# Patient Record
Sex: FEMALE | Race: ASIAN | NOT HISPANIC OR LATINO | Employment: FULL TIME | URBAN - METROPOLITAN AREA
[De-identification: names, ages, dates, MRNs, and addresses within clinical notes are randomized per-mention and may not be internally consistent; named-entity substitution may affect disease eponyms.]

---

## 2018-12-20 ENCOUNTER — TELEPHONE (OUTPATIENT)
Dept: SCHEDULING | Facility: IMAGING CENTER | Age: 28
End: 2018-12-20

## 2019-01-23 ENCOUNTER — OFFICE VISIT (OUTPATIENT)
Dept: MEDICAL GROUP | Facility: PHYSICIAN GROUP | Age: 29
End: 2019-01-23
Payer: COMMERCIAL

## 2019-01-23 ENCOUNTER — HOSPITAL ENCOUNTER (OUTPATIENT)
Facility: MEDICAL CENTER | Age: 29
End: 2019-01-23
Attending: PHYSICIAN ASSISTANT
Payer: COMMERCIAL

## 2019-01-23 VITALS
HEART RATE: 87 BPM | OXYGEN SATURATION: 96 % | TEMPERATURE: 99.4 F | BODY MASS INDEX: 22.84 KG/M2 | SYSTOLIC BLOOD PRESSURE: 110 MMHG | RESPIRATION RATE: 14 BRPM | HEIGHT: 61 IN | WEIGHT: 121 LBS | DIASTOLIC BLOOD PRESSURE: 78 MMHG

## 2019-01-23 DIAGNOSIS — Z11.3 ROUTINE SCREENING FOR STI (SEXUALLY TRANSMITTED INFECTION): ICD-10-CM

## 2019-01-23 DIAGNOSIS — Z72.51 HIGH RISK HETEROSEXUAL BEHAVIOR: ICD-10-CM

## 2019-01-23 DIAGNOSIS — Z30.011 ENCOUNTER FOR INITIAL PRESCRIPTION OF CONTRACEPTIVE PILLS: ICD-10-CM

## 2019-01-23 LAB
INT CON NEG: NEGATIVE
INT CON POS: POSITIVE
POC URINE PREGNANCY TEST: NEGATIVE

## 2019-01-23 PROCEDURE — 99204 OFFICE O/P NEW MOD 45 MIN: CPT | Performed by: PHYSICIAN ASSISTANT

## 2019-01-23 PROCEDURE — 87591 N.GONORRHOEAE DNA AMP PROB: CPT

## 2019-01-23 PROCEDURE — 81025 URINE PREGNANCY TEST: CPT | Performed by: PHYSICIAN ASSISTANT

## 2019-01-23 PROCEDURE — 87491 CHLMYD TRACH DNA AMP PROBE: CPT

## 2019-01-23 RX ORDER — DROSPIRENONE AND ETHINYL ESTRADIOL 0.02-3(28)
1 KIT ORAL DAILY
Qty: 28 TAB | Refills: 11 | Status: SHIPPED | OUTPATIENT
Start: 2019-01-23 | End: 2020-02-06

## 2019-01-23 ASSESSMENT — PATIENT HEALTH QUESTIONNAIRE - PHQ9: CLINICAL INTERPRETATION OF PHQ2 SCORE: 0

## 2019-01-23 NOTE — LETTER
[facility]  Oanh Martin P.A.-C.  [address]  [place] [address]  Fax: [phone]   Authorization for Release/Disclosure of   Protected Health Information   Name: CHARLI FRANKLIN : 1990 SSN: [id number]   Address: [address] [place] [address] [place] [address] Phone:    [phone] (home)    I authorize the entity listed below to release/disclose the PHI below to:   [facility]/Oanh Martin P.A.-C. and Oanh Martin P.A.-C.   Provider or Entity Name:     Address   City, State, Zip   Phone:      Fax:     Reason for request: continuity of care   Information to be released:    [  ] LAST COLONOSCOPY,  including any PATH REPORT and follow-up  [  ] LAST FIT/COLOGUARD RESULT [  ] LAST DEXA  [  ] LAST MAMMOGRAM  [  ] LAST PAP  [  ] LAST LABS [  ] RETINA EXAM REPORT  [  ] IMMUNIZATION RECORDS  [  ] Release all info      [  ] Check here and initial the line next to each item to release ALL health information INCLUDING  _____ Care and treatment for drug and / or alcohol abuse  _____ HIV testing, infection status, or AIDS  _____ Genetic Testing    DATES OF SERVICE OR TIME PERIOD TO BE DISCLOSED: _____________  I understand and acknowledge that:  * This Authorization may be revoked at any time by you in writing, except if your health information has already been used or disclosed.  * Your health information that will be used or disclosed as a result of you signing this authorization could be re-disclosed by the recipient. If this occurs, your re-disclosed health information may no longer be protected by State or Federal laws.  * You may refuse to sign this Authorization. Your refusal will not affect your ability to obtain treatment.  * This Authorization becomes effective upon signing and will  on (date) __________.      If no date is indicated, this Authorization will  one (1) year from the signature date.    Name: Charli Franklin    Signature:   Date:     2019       PLEASE FAX REQUESTED RECORDS  BACK TO: [phone]

## 2019-01-24 DIAGNOSIS — Z11.3 ROUTINE SCREENING FOR STI (SEXUALLY TRANSMITTED INFECTION): ICD-10-CM

## 2019-01-24 DIAGNOSIS — Z72.51 HIGH RISK HETEROSEXUAL BEHAVIOR: ICD-10-CM

## 2019-01-24 LAB
C TRACH DNA SPEC QL NAA+PROBE: NEGATIVE
N GONORRHOEA DNA SPEC QL NAA+PROBE: NEGATIVE
SPECIMEN SOURCE: NORMAL

## 2019-01-24 NOTE — PROGRESS NOTES
Chief Complaint   Patient presents with   • Establish Care     pt would like BC       HISTORY OF THE PRESENT ILLNESS: Jennifer Emerson is a 28 y.o. female new patient to our practice. This pleasant patient is here today to establish care and to discuss the evaluation and management of:    Patient is a pleasant 28-year-old female here today to establish care.  She tells me she is in overall good health and would like to discuss oral contraceptive.  She tells me that she is sexually active with multiple partners.  States she is not in a committed relationship and is dating.  Admits to male condom use but not always.  Patient would like to be screened for chlamydia and gonorrhea.  She tells me that she was on Ortho Tri-Cyclen for 4-5 years but during the last year of taking the medication she experienced unpleasant side effects.  States she felt nauseous and had an upset stomach every day after taking her medication.  States she discontinue medication sometime from 03/18 -06/18.  She tells me menstrual cycles are regular and menses without birth control is 5 days in duration.  States when on birth control and sees was 3-4 days in duration.  States without birth control menstrual periods are positive for heavy to moderate bleeding and with birth control menses was light.  She tells me she has never experienced menstrual cramping until the last month.  States last menstrual period was the first of this month.  She denies being pregnant.  Denies personal history or family history of DVT/PEs.  Denies smoking.  Denies having a sedentary lifestyle.  States exercise routine consist of lifting weights 4 days a week 1-1.5 hours per time.  States her diet is healthy.    She denies blisters/warts/lesions.  Denies fever, chills, night sweats, nausea, vomiting, unintentional weight loss, joint pain, pelvic pain, dysuria, hematuria, urethral discharge, foul-smelling discharge.      History reviewed. No pertinent past medical  history.    History reviewed. No pertinent surgical history.    Family Status   Relation Status   • Mo Alive   • Fa    • Sis Alive   • Bro Alive   • MGMo Alive   • MGFa    • PGMo Alive   • PGFa Alive     Family History   Problem Relation Age of Onset   • Cancer Father    • No Known Problems Sister    • No Known Problems Brother    • Heart Disease Paternal Grandmother    • Diabetes Paternal Grandfather    • Heart Attack Paternal Grandfather        Social History   Substance Use Topics   • Smoking status: Never Smoker   • Smokeless tobacco: Never Used   • Alcohol use Yes      Comment: socially.        Allergies: Patient has no known allergies.    Current Outpatient Prescriptions Ordered in [facility]   Medication Sig Dispense Refill   • drospirenone-ethinyl estradiol (DEBBIE) 3-0.02 MG per tablet Take 1 Tab by mouth every day. 28 Tab 11     No current [facility]-ordered facility-administered medications on file.        Review of Systems   Constitutional: Negative for fever, chills, weight loss and malaise/fatigue.   HENT: Negative for ear pain, nosebleeds, congestion, sore throat and neck pain.    Eyes: Negative for blurred vision.   Respiratory: Negative for cough, sputum production, shortness of breath and wheezing.    Cardiovascular: Negative for chest pain, palpitations, orthopnea and leg swelling.   Gastrointestinal: Negative for heartburn, nausea, vomiting and abdominal pain.   Genitourinary: Negative for dysuria, urgency and frequency.   Musculoskeletal: Negative for myalgias, back pain and joint pain.   Skin: Negative for rash and itching.   Neurological: Negative for dizziness, tingling, tremors, sensory change, focal weakness and headaches.   Endo/Heme/Allergies: Does not bruise/bleed easily.   Psychiatric/Behavioral: Negative for depression, anxiety, or memory loss.     All other systems reviewed and are negative except as in HPI.    Exam: Blood pressure 110/78, pulse 87, temperature 37.4 °C (99.4 °F),  "resp. rate 14, height 1.549 m (5' 1\"), weight 54.9 kg (121 lb), SpO2 96 %. Body mass index is 22.86 kg/m².  General: Normal appearing. No distress.  HEENT: Normocephalic. Eyes conjunctiva clear lids without ptosis, pupils equal and reactive to light accommodation, ears normal shape and contour, canals are clear bilaterally, tympanic membranes are benign, nasal mucosa benign, oropharynx is without erythema, edema or exudates.   Neck: Supple without JVD or bruit. Thyroid is not enlarged.  Pulmonary: Clear to ausculation.  Normal effort. No rales, ronchi, or wheezing.  Cardiovascular: Regular rate and rhythm without murmur.   Abdomen: Soft, nontender, nondistended. Normal bowel sounds. Liver and spleen are not palpable  Neurologic: Grossly nonfocal  Lymph: No cervical, supraclavicular or axillary lymph nodes are palpable  Skin: Warm and dry.  No obvious lesions.  Musculoskeletal: Normal gait. No extremity cyanosis, clubbing, or edema.  Psych: Normal mood and affect. Alert and oriented x3. Judgment and insight is normal.      Medical decision-making and discussion:  1. Encounter for initial prescription of contraceptive pills  POCT Pregnancy: Negative  Discussed OCPs at length. Instructed on importance of taking pill at the same time every day. Use back-up for up to 2 weeks. Discussed risks associated with OCPs including: blood clot, heart attack, and stroke.  Patient denies smoking.  Advised not to smoke while on hormonal birth control.  Emphasized the benefits of a healthy diet regular exercise routine with patient.    Follow-up for worsening symptoms,lack of expected recovery, or should new symptoms or problems arise.    - POCT Pregnancy  - drospirenone-ethinyl estradiol (DEBBIE) 3-0.02 MG per tablet; Take 1 Tab by mouth every day.  Dispense: 28 Tab; Refill: 11    2. Routine screening for STI (sexually transmitted infection)  3. High risk heterosexual behavior  Committee and gonorrhea have been ordered to further " evaluate patient.  Patient will be contacted with results.  Patient declined HIV and syphilis workup.  Discussed STI risk with patient.  Discussed importance of practicing safe sex.    Follow-up for worsening symptoms,lack of expected recovery, or should new symptoms or problems arise.      - CHLAMYDIA/GC PCR URINE OR SWAB; Future    She tells me that her Pap smear is up-to-date.  States Pap smear was completed 2 years ago and [place].  Medical release form has been signed by patient to obtain past medical records.  States her influenza vaccination is up-to-date as well.    Please note that this dictation was created using voice recognition software. I have made every reasonable attempt to correct obvious errors, but I expect that there are errors of grammar and possibly content that I did not discover before finalizing the note.        Return if symptoms worsen or fail to improve.

## 2019-01-25 ENCOUNTER — TELEPHONE (OUTPATIENT)
Dept: MEDICAL GROUP | Facility: PHYSICIAN GROUP | Age: 29
End: 2019-01-25

## 2019-01-25 NOTE — TELEPHONE ENCOUNTER
Phone Number Called: [phone] (home)       Message: Left generic voice message for the pt to call back re results.    Left Message for patient to call back: yes

## 2019-01-25 NOTE — TELEPHONE ENCOUNTER
----- Message from Oanh Martin P.A.-C. sent at 1/25/2019  7:09 AM PST -----  Please call patient. I have reviewed patient's lab work and patient is negative for chlamydia and gonorrhea.    Thank you,    Meghan BOONE

## 2019-01-31 NOTE — TELEPHONE ENCOUNTER
Phone Number Called: [phone] (home)     Message: Spoke with patient notified of results below no questions at this time.     Left Message for patient to call back: N\A

## 2019-08-23 ENCOUNTER — HOSPITAL ENCOUNTER (OUTPATIENT)
Facility: MEDICAL CENTER | Age: 29
End: 2019-08-23
Attending: PHYSICIAN ASSISTANT
Payer: COMMERCIAL

## 2019-08-23 ENCOUNTER — OFFICE VISIT (OUTPATIENT)
Dept: MEDICAL GROUP | Facility: PHYSICIAN GROUP | Age: 29
End: 2019-08-23
Payer: COMMERCIAL

## 2019-08-23 VITALS
HEART RATE: 60 BPM | WEIGHT: 120 LBS | OXYGEN SATURATION: 100 % | DIASTOLIC BLOOD PRESSURE: 70 MMHG | SYSTOLIC BLOOD PRESSURE: 110 MMHG | RESPIRATION RATE: 12 BRPM | HEIGHT: 61 IN | TEMPERATURE: 97.9 F | BODY MASS INDEX: 22.66 KG/M2

## 2019-08-23 DIAGNOSIS — Z72.51 HIGH RISK HETEROSEXUAL BEHAVIOR: ICD-10-CM

## 2019-08-23 DIAGNOSIS — Z12.4 SCREENING FOR CERVICAL CANCER: ICD-10-CM

## 2019-08-23 DIAGNOSIS — Z01.419 WOMEN'S ANNUAL ROUTINE GYNECOLOGICAL EXAMINATION: Primary | ICD-10-CM

## 2019-08-23 DIAGNOSIS — Z01.419 WOMEN'S ANNUAL ROUTINE GYNECOLOGICAL EXAMINATION: ICD-10-CM

## 2019-08-23 PROCEDURE — 99395 PREV VISIT EST AGE 18-39: CPT | Performed by: PHYSICIAN ASSISTANT

## 2019-08-23 PROCEDURE — 87491 CHLMYD TRACH DNA AMP PROBE: CPT

## 2019-08-23 PROCEDURE — 87480 CANDIDA DNA DIR PROBE: CPT

## 2019-08-23 PROCEDURE — 87591 N.GONORRHOEAE DNA AMP PROB: CPT

## 2019-08-23 PROCEDURE — 87660 TRICHOMONAS VAGIN DIR PROBE: CPT

## 2019-08-23 PROCEDURE — 87510 GARDNER VAG DNA DIR PROBE: CPT

## 2019-08-23 PROCEDURE — 88175 CYTOPATH C/V AUTO FLUID REDO: CPT

## 2019-08-23 NOTE — PROGRESS NOTES
SUBJECTIVE: 28 y.o. female for annual routine gynecologic exam  Chief Complaint   Patient presents with   • Gynecologic Exam     Pap Smear        OB History   No data available      Last Pap: ~ 3 y ears ago  Social History     Substance and Sexual Activity   Sexual Activity Yes   • Partners: Male   • Birth control/protection: Condom Male     Sexual history: currently sexually active, multiple partners, heterosexual   H/O Abnormal Pap no  She  reports that she has never smoked. She has never used smokeless tobacco.        Allergies: Patient has no known allergies.     ROS:    Reports no menopause symptoms of hot flashes, night sweats, sleep disruption, mood changes.Denies vaginal dryness.   Menses every month with 5 days spotting, light, moderate, heavy (2 days) bleeding   Cramping is mild but can occasionally be severe.  She does not take OTC analgesics for cramping  No significant bloating/fluid retention, pelvic pain, or dyspareunia. No vaginal discharge   No breast tenderness, mass, nipple discharge, changes in size or contour, or abnormal cyclic discomfort.  No urinary tract symptoms, no incontinence, no polydipsia, polyuria,  No abdominal pain, change in bowel habits, black or bloody stools.    No unusual headaches, no visual changes, menstrual migraines   No prolonged cough. No dyspnea or chest pain on exertion.  No depression, labile mood, anxiety, libido changes, insomnia.  No temperature intolerance.  No new/concerning skin lesions, concerns.     Exercise: moderate regular exercise program she tells me that exercise routine consist of cardiovascular and strength training 5-6 times a week 1.5-2 hours per time.    Preventive Care:  Denies doing self breast exam. Does not take a multi-vitamin.     Current medicines (including changes today)  Current Outpatient Medications   Medication Sig Dispense Refill   • drospirenone-ethinyl estradiol (DEBBIE) 3-0.02 MG per tablet Take 1 Tab by mouth every day. (Patient not  "taking: Reported on 8/23/2019) 28 Tab 11     No current facility-administered medications for this visit.      She  has no past medical history on file.  She  has no past surgical history on file.     Family History:   Family History   Problem Relation Age of Onset   • Cancer Father    • No Known Problems Sister    • No Known Problems Brother    • Heart Disease Paternal Grandmother    • Diabetes Paternal Grandfather    • Heart Attack Paternal Grandfather        OBJECTIVE:   /70 (BP Location: Left arm, Patient Position: Sitting, BP Cuff Size: Adult)   Pulse 60   Temp 36.6 °C (97.9 °F) (Temporal)   Resp 12   Ht 1.549 m (5' 1\")   Wt 54.4 kg (120 lb)   LMP 07/29/2019   SpO2 100%   Breastfeeding? No   BMI 22.67 kg/m²   Body mass index is 22.67 kg/m².    Exam: /70 (BP Location: Left arm, Patient Position: Sitting, BP Cuff Size: Adult)   Pulse 60   Temp 36.6 °C (97.9 °F) (Temporal)   Resp 12   Ht 1.549 m (5' 1\")   Wt 54.4 kg (120 lb)   SpO2 100%   General: Normal appearing. No distress.  HEENT: Normocephalic. Eyes conjunctiva clear lids without ptosis, pupils equal and reactive to light accommodation, ears normal shape and contour, canals are clear bilaterally, tympanic membranes are benign, nasal mucosa benign, oropharynx is without erythema, edema or exudates.   Neck: Supple without JVD or bruit. Thyroid is not enlarged.  Pulmonary: Clear to ausculation.  Normal effort. No rales, ronchi, or wheezing.  Cardiovascular: Regular rate and rhythm without murmur.  Abdomen: Soft, nontender, nondistended. Normal bowel sounds. Liver and spleen are not palpable  Neurologic: Grossly nonfocal.  Cranial nerves are normal.  Lymph: No cervical, supraclavicular or axillary lymph nodes are palpable  Skin: Warm and dry.  No rashes or suspicious skin lesions.  Musculoskeletal: Normal gait. No extremity cyanosis, clubbing, or edema.  Psych: Normal mood and affect. Alert and oriented x3. Judgment and insight is " normal.     Breast Exam: Performed with instruction during examination. No axillary lymphadenopathy, no skin changes, no dominant masses. No nipple retraction  Pelvic Exam -  Normal external genitalia with no lesions. Vaginal Mucosa:  normal vaginal mucosa. + for yellow thin discharge. Cervix with no visible lesions but is friable. No cervical motion tenderness. Uterus is normal sized with no masses. No adnexal tenderness or enlargement appreciated. Thin Prep Pap is obtained, vaginal swab is obtained and specimen(s) sent to lab  Rectal: deferred    <ASSESSMENT and PLAN>     1. Women's annual routine gynecological examination  Thin Prep Pap is obtained, vaginal swab is obtained and specimen(s) sent to lab    - VAGINAL PATHOGENS DNA PANEL; Future  - THINPREP RFLX HPV ASCUS W/CTNG; Future    2. Screening for cervical cancer  Thin Prep Pap is obtained, vaginal swab is obtained and specimen(s) sent to lab    - VAGINAL PATHOGENS DNA PANEL; Future  - THINPREP RFLX HPV ASCUS W/CTNG; Future    3. High risk heterosexual behavior  Patient states she is currently sexually active with 3 separate male partners.  Denies condom use.  Patient is not on oral contraceptive.  States she discontinued oral contraceptive because she started experiencing headaches with it.  Lab work has been ordered to further evaluate patient.  Patient will be contacted with results.  Stressed the importance of practicing safe sex.  Discussed STI risk with patient.    Patient will follow up on 8/28/2019 to discuss eczema and headaches.    I will discuss birth control options with patient during that appointment.      - VAGINAL PATHOGENS DNA PANEL; Future  - THINPREP RFLX HPV ASCUS W/CTNG; Future  - RPR (SYPHILIS); Future  - HIV AG/AB COMBO ASSAY SCREENING; Future  - HEP C VIRUS ANTIBODY; Future    1. Women's annual routine gynecological examination  VAGINAL PATHOGENS DNA PANEL    THINPREP RFLX HPV ASCUS W/CTNG   2. Screening for cervical cancer  VAGINAL  PATHOGENS DNA PANEL    THINPREP RFLX HPV ASCUS W/CTNG   3. High risk heterosexual behavior  VAGINAL PATHOGENS DNA PANEL    THINPREP RFLX HPV ASCUS W/CTNG    RPR (SYPHILIS)    HIV AG/AB COMBO ASSAY SCREENING    HEP C VIRUS ANTIBODY       Discussed  breast self exam, STD prevention, HIV risk factors and prevention, feminine hygiene, adequate intake of calcium and vitamin D, diet and exercise   Follow-up in 3 years for next Gyn exam and 3 years for next Pap.   Next office visit for recheck of chronic medical conditions is due in 1 week.

## 2019-08-24 LAB
CANDIDA DNA VAG QL PROBE+SIG AMP: NEGATIVE
G VAGINALIS DNA VAG QL PROBE+SIG AMP: NEGATIVE
T VAGINALIS DNA VAG QL PROBE+SIG AMP: NEGATIVE

## 2019-08-26 LAB
C TRACH DNA GENITAL QL NAA+PROBE: NEGATIVE
CYTOLOGY REG CYTOL: NORMAL
N GONORRHOEA DNA GENITAL QL NAA+PROBE: NEGATIVE
SPECIMEN SOURCE: NORMAL

## 2019-08-28 ENCOUNTER — OFFICE VISIT (OUTPATIENT)
Dept: MEDICAL GROUP | Facility: PHYSICIAN GROUP | Age: 29
End: 2019-08-28
Payer: COMMERCIAL

## 2019-08-28 VITALS
HEIGHT: 61 IN | OXYGEN SATURATION: 99 % | TEMPERATURE: 98.4 F | BODY MASS INDEX: 22.66 KG/M2 | HEART RATE: 74 BPM | WEIGHT: 120 LBS | DIASTOLIC BLOOD PRESSURE: 68 MMHG | SYSTOLIC BLOOD PRESSURE: 112 MMHG | RESPIRATION RATE: 16 BRPM

## 2019-08-28 DIAGNOSIS — G44.89 OTHER HEADACHE SYNDROME: ICD-10-CM

## 2019-08-28 DIAGNOSIS — L30.9 ECZEMA, UNSPECIFIED TYPE: ICD-10-CM

## 2019-08-28 DIAGNOSIS — R51.9 NEW ONSET OF HEADACHES: ICD-10-CM

## 2019-08-28 PROCEDURE — 99214 OFFICE O/P EST MOD 30 MIN: CPT | Performed by: PHYSICIAN ASSISTANT

## 2019-08-28 RX ORDER — TRIAMCINOLONE ACETONIDE 5 MG/G
CREAM TOPICAL
Qty: 15 G | Refills: 2 | Status: SHIPPED | OUTPATIENT
Start: 2019-08-28 | End: 2020-02-06

## 2019-08-30 PROBLEM — G44.89 OTHER HEADACHE SYNDROME: Status: ACTIVE | Noted: 2019-08-30

## 2019-08-30 PROBLEM — R51.9 NEW ONSET OF HEADACHES: Status: ACTIVE | Noted: 2019-08-30

## 2019-08-30 PROBLEM — L30.9 ECZEMA: Status: ACTIVE | Noted: 2019-08-30

## 2019-08-30 NOTE — PROGRESS NOTES
Chief Complaint   Patient presents with   • Headache   • Medication Management     triamcinolon 0.5% cream       HISTORY OF PRESENT ILLNESS: Jennifer Emerson is an established 28 y.o. female here to discuss the evaluation and management of:    No problem-specific Assessment & Plan notes found for this encounter.      She is a pleasant 28-year-old female here today to discuss headache symptoms and eczema.  She tells me since around 11 years old she is been experiencing intermittent episodes of a low-grade throbbing headache pain located at the base of her head.  States 3+ months ago headache symptoms changed and now she intermittently experiences a severe sharp intense headache pain back of her head.  She tells me feels like her skull is ripping into.  States severe headache symptoms occur anywhere from 10 to 30 minutes in duration.  He tells me when severe headache symptoms develop she does experience nausea.  Denies vomiting, light sensitivity, sound sensitivity, vision changes.  Denies other neurological deficits.  States headache symptoms predominantly developed if she is stressed, with quick positional changes, or when lifting weights.  Does not feel that she is dehydrated.  She tells me that her diet and exercise is good.    Denies red flag symptoms, including: persistent headache, headache worse with bending over or sneezing, headache waking up during sleep. Also denies difficulty with speech, focal weakness, fever, cough, sinus congestion or teeth grinding.    Family Hx: No  Prior imaging: No    She also tells me that she suffers from eczema but feels symptoms have improved with age.  Patient is prescribed Kenalog 0.5% cream and is requesting a refill.  States that she only uses medication during acute flares and usually has to use medication for a few days.  States she also uses an oil based lotion.  Asymptomatic at this time.      Patient Active Problem List    Diagnosis Date Noted   • New onset of headaches  2019   • Other headache syndrome 2019   • Eczema 2019       Allergies:Patient has no known allergies.    Current Outpatient Medications   Medication Sig Dispense Refill   • triamcinolone acetonide (KENALOG) 0.5 % Cream Apply cream topically to affected areas twice a day. Do not use cream more than 2weeks consecutively on body or 1week consecutively on face. 15 g 2   • drospirenone-ethinyl estradiol (DEBBIE) 3-0.02 MG per tablet Take 1 Tab by mouth every day. (Patient not taking: Reported on 2019) 28 Tab 11     No current facility-administered medications for this visit.        Social History     Tobacco Use   • Smoking status: Never Smoker   • Smokeless tobacco: Never Used   Substance Use Topics   • Alcohol use: Yes     Comment: socially.    • Drug use: Yes     Types: Marijuana     Comment: daily.        Family Status   Relation Name Status   • Mo  Alive   • Fa     • Sis  Alive   • Bro  Alive   • MGMo  Alive   • MGFa     • PGMo  Alive   • PGFa  Alive     Family History   Problem Relation Age of Onset   • Cancer Father    • No Known Problems Sister    • No Known Problems Brother    • Heart Disease Paternal Grandmother    • Diabetes Paternal Grandfather    • Heart Attack Paternal Grandfather        ROS:  Review of Systems   Constitutional: Negative for fever, chills, weight loss and malaise/fatigue.   HENT: Negative for ear pain, nosebleeds, congestion, sore throat and neck pain.    Eyes: Negative for blurred vision.   Respiratory: Negative for cough, sputum production, shortness of breath and wheezing.    Cardiovascular: Negative for chest pain, palpitations, orthopnea and leg swelling.   Gastrointestinal: Negative for heartburn, nausea, vomiting and abdominal pain.   Genitourinary: Negative for dysuria, urgency and frequency.   Musculoskeletal: Negative for myalgias, back pain and joint pain.   Skin: Negative for rash and itching.   Neurological: Negative for dizziness, tingling,  "tremors, sensory change, focal weakness. + for headaches.   Endo/Heme/Allergies: Does not bruise/bleed easily.   Psychiatric/Behavioral: Negative for depression, suicidal ideas and memory loss.  The patient is not nervous/anxious and does not have insomnia.    All other systems reviewed and are negative except as in HPI.    Exam: /68   Pulse 74   Temp 36.9 °C (98.4 °F)   Resp 16   Ht 1.549 m (5' 1\")   Wt 54.4 kg (120 lb)   SpO2 99%  Body mass index is 22.67 kg/m².  General: Normal appearing. No distress.  HEENT: Normocephalic. Eyes conjunctiva clear lids without ptosis, pupils equal and reactive to light accommodation, ears normal shape and contour, canals are clear bilaterally, tympanic membranes are benign, nasal mucosa benign, oropharynx is without erythema, edema or exudates.    Neck: Supple without JVD or bruit. Thyroid is not enlarged.  Pulmonary: Clear to ausculation.  Normal effort. No rales, ronchi, or wheezing.  Cardiovascular: Regular rate and rhythm without murmur.   Abdomen: Soft, nontender, nondistended. Normal bowel sounds. Liver and spleen are not palpable  Neurologic: Grossly nonfocal.  Cranial nerves are normal.   Lymph: No cervical, supraclavicular or axillary lymph nodes are palpable  Skin: Warm and dry.  No rashes or suspicious skin lesions.  Musculoskeletal: Normal gait. No extremity cyanosis, clubbing, or edema.  Posterior head and neck is nontender to palpation.  No signs of trauma.  Psych: Normal mood and affect. Alert and oriented x3. Judgment and insight is normal.    Medical decision-making and discussion:  1. New onset of headaches  2. Other headache syndrome  MRI of brain without contrast has been ordered to further evaluate patient.  Patient will be contacted with results.  Advised patient to work on diet, exercise, sleep hygiene.  Suggested over-the-counter Excedrin.  Advised patient to keep a headache diary.  Follow-up appointment will be made after MRI results are " obtained.    Discussed ED precautions in great detail with patient.  Advised patient if she develops the worst headache of her life to seek immediate emergency care.  Patient verbally consented that she would.    - MR-BRAIN-W/O; Future    3. Eczema, unspecified type  Minimize bathing.  Pat dry after bathing and apply steroid cream and moisturizer.  Apply over-the-counter prescribed Kenalog cream twice a day to affected areas as needed.  Wait 10 minutes after applying steroid cream before putting on moisturizer cream.Vaseline petroleum jelly, Eucerin cream, Ceravae, Aquaphor, Cetaphil twice a day to areas of concern.    Do not use steroid cream on face for more than 1 week at a time or on body for more than 2 weeks consecutively.    - triamcinolone acetonide (KENALOG) 0.5 % Cream; Apply cream topically to affected areas twice a day. Do not use cream more than 2weeks consecutively on body or 1week consecutively on face.  Dispense: 15 g; Refill: 2      Please note that this dictation was created using voice recognition software. I have made every reasonable attempt to correct obvious errors, but I expect that there are errors of grammar and possibly content that I did not discover before finalizing the note.    Assessment/Plan:  1. New onset of headaches     2. Other headache syndrome  MR-BRAIN-W/O   3. Eczema, unspecified type  triamcinolone acetonide (KENALOG) 0.5 % Cream       Return if symptoms worsen or fail to improve.

## 2019-09-14 ENCOUNTER — HOSPITAL ENCOUNTER (OUTPATIENT)
Dept: LAB | Facility: MEDICAL CENTER | Age: 29
End: 2019-09-14
Attending: PHYSICIAN ASSISTANT
Payer: COMMERCIAL

## 2019-09-14 DIAGNOSIS — Z72.51 HIGH RISK HETEROSEXUAL BEHAVIOR: ICD-10-CM

## 2019-09-14 LAB
HCV AB SER QL: NEGATIVE
HIV 1+2 AB+HIV1 P24 AG SERPL QL IA: NON REACTIVE
TREPONEMA PALLIDUM IGG+IGM AB [PRESENCE] IN SERUM OR PLASMA BY IMMUNOASSAY: NON REACTIVE

## 2019-09-14 PROCEDURE — 86780 TREPONEMA PALLIDUM: CPT

## 2019-09-14 PROCEDURE — 36415 COLL VENOUS BLD VENIPUNCTURE: CPT

## 2019-09-14 PROCEDURE — 86803 HEPATITIS C AB TEST: CPT

## 2019-09-14 PROCEDURE — 87389 HIV-1 AG W/HIV-1&-2 AB AG IA: CPT

## 2019-09-17 ENCOUNTER — TELEPHONE (OUTPATIENT)
Dept: MEDICAL GROUP | Facility: PHYSICIAN GROUP | Age: 29
End: 2019-09-17

## 2019-09-17 NOTE — TELEPHONE ENCOUNTER
----- Message from Oanh Martin P.A.-C. sent at 9/17/2019  9:37 AM PDT -----  Please call patient. I have reviewed patient's lab work and results are negative for hep C, HIV, and syphilis.    Thank you,    Meghan BOONE

## 2019-09-28 ENCOUNTER — HOSPITAL ENCOUNTER (OUTPATIENT)
Dept: RADIOLOGY | Facility: MEDICAL CENTER | Age: 29
End: 2019-09-28
Attending: PHYSICIAN ASSISTANT
Payer: COMMERCIAL

## 2019-09-28 DIAGNOSIS — G44.89 OTHER HEADACHE SYNDROME: ICD-10-CM

## 2019-09-28 DIAGNOSIS — R51.9 NEW ONSET OF HEADACHES: ICD-10-CM

## 2019-09-28 PROCEDURE — 70551 MRI BRAIN STEM W/O DYE: CPT

## 2019-10-16 ENCOUNTER — TELEPHONE (OUTPATIENT)
Dept: NEUROLOGY | Facility: MEDICAL CENTER | Age: 29
End: 2019-10-16

## 2019-10-17 NOTE — TELEPHONE ENCOUNTER
I assume you meant her MRI scan of the brain which was done on September 28, 2019.  I do not see a CT scan in the record.  The lesions seen are nonspecific, certainly not classic for MS.  They are chronic, nothing active or acute.  These can be seen in any number of benign conditions.  A lot would be determined by what the history is, if there are associated symptoms, etc.  I see the history suggests that this patient has headaches, migraine headaches can be associated with these types of lesions.

## 2020-02-06 ENCOUNTER — OFFICE VISIT (OUTPATIENT)
Dept: MEDICAL GROUP | Facility: PHYSICIAN GROUP | Age: 30
End: 2020-02-06
Payer: COMMERCIAL

## 2020-02-06 ENCOUNTER — HOSPITAL ENCOUNTER (OUTPATIENT)
Facility: MEDICAL CENTER | Age: 30
End: 2020-02-06
Attending: PHYSICIAN ASSISTANT
Payer: COMMERCIAL

## 2020-02-06 VITALS
SYSTOLIC BLOOD PRESSURE: 100 MMHG | RESPIRATION RATE: 16 BRPM | OXYGEN SATURATION: 99 % | BODY MASS INDEX: 21.83 KG/M2 | DIASTOLIC BLOOD PRESSURE: 52 MMHG | TEMPERATURE: 97.8 F | WEIGHT: 115.6 LBS | HEART RATE: 68 BPM | HEIGHT: 61 IN

## 2020-02-06 DIAGNOSIS — Z72.51 HIGH RISK HETEROSEXUAL BEHAVIOR: ICD-10-CM

## 2020-02-06 DIAGNOSIS — Z11.3 ROUTINE SCREENING FOR STI (SEXUALLY TRANSMITTED INFECTION): ICD-10-CM

## 2020-02-06 DIAGNOSIS — Z30.011 ENCOUNTER FOR INITIAL PRESCRIPTION OF CONTRACEPTIVE PILLS: ICD-10-CM

## 2020-02-06 LAB
APPEARANCE UR: CLEAR
BILIRUB UR STRIP-MCNC: NORMAL MG/DL
COLOR UR AUTO: YELLOW
GLUCOSE UR STRIP.AUTO-MCNC: NORMAL MG/DL
INT CON NEG: NEGATIVE
INT CON POS: POSITIVE
KETONES UR STRIP.AUTO-MCNC: NORMAL MG/DL
LEUKOCYTE ESTERASE UR QL STRIP.AUTO: NORMAL
NITRITE UR QL STRIP.AUTO: NORMAL
PH UR STRIP.AUTO: 7 [PH] (ref 5–8)
POC URINE PREGNANCY TEST: NORMAL
PROT UR QL STRIP: NORMAL MG/DL
RBC UR QL AUTO: NORMAL
SP GR UR STRIP.AUTO: 1.01
UROBILINOGEN UR STRIP-MCNC: 0.2 MG/DL

## 2020-02-06 PROCEDURE — 87660 TRICHOMONAS VAGIN DIR PROBE: CPT

## 2020-02-06 PROCEDURE — 99214 OFFICE O/P EST MOD 30 MIN: CPT | Performed by: PHYSICIAN ASSISTANT

## 2020-02-06 PROCEDURE — 81025 URINE PREGNANCY TEST: CPT | Performed by: PHYSICIAN ASSISTANT

## 2020-02-06 PROCEDURE — 99000 SPECIMEN HANDLING OFFICE-LAB: CPT | Performed by: PHYSICIAN ASSISTANT

## 2020-02-06 PROCEDURE — 87491 CHLMYD TRACH DNA AMP PROBE: CPT

## 2020-02-06 PROCEDURE — 87480 CANDIDA DNA DIR PROBE: CPT

## 2020-02-06 PROCEDURE — 87510 GARDNER VAG DNA DIR PROBE: CPT

## 2020-02-06 PROCEDURE — 87591 N.GONORRHOEAE DNA AMP PROB: CPT

## 2020-02-06 PROCEDURE — 81002 URINALYSIS NONAUTO W/O SCOPE: CPT | Performed by: PHYSICIAN ASSISTANT

## 2020-02-06 RX ORDER — DROSPIRENONE AND ETHINYL ESTRADIOL 0.03MG-3MG
1 KIT ORAL DAILY
Qty: 84 TAB | Refills: 3 | Status: SHIPPED | OUTPATIENT
Start: 2020-02-06

## 2020-02-07 ENCOUNTER — HOSPITAL ENCOUNTER (OUTPATIENT)
Dept: LAB | Facility: MEDICAL CENTER | Age: 30
End: 2020-02-07
Attending: PHYSICIAN ASSISTANT
Payer: COMMERCIAL

## 2020-02-07 DIAGNOSIS — Z72.51 HIGH RISK HETEROSEXUAL BEHAVIOR: ICD-10-CM

## 2020-02-07 DIAGNOSIS — Z11.3 ROUTINE SCREENING FOR STI (SEXUALLY TRANSMITTED INFECTION): ICD-10-CM

## 2020-02-07 LAB
C TRACH DNA SPEC QL NAA+PROBE: NEGATIVE
CANDIDA DNA VAG QL PROBE+SIG AMP: NEGATIVE
G VAGINALIS DNA VAG QL PROBE+SIG AMP: POSITIVE
HBV SURFACE AG SER QL: NEGATIVE
HCV AB SER QL: NEGATIVE
HIV 1+2 AB+HIV1 P24 AG SERPL QL IA: NON REACTIVE
N GONORRHOEA DNA SPEC QL NAA+PROBE: NEGATIVE
SPECIMEN SOURCE: NORMAL
T VAGINALIS DNA VAG QL PROBE+SIG AMP: NEGATIVE
TREPONEMA PALLIDUM IGG+IGM AB [PRESENCE] IN SERUM OR PLASMA BY IMMUNOASSAY: NON REACTIVE

## 2020-02-07 PROCEDURE — 87389 HIV-1 AG W/HIV-1&-2 AB AG IA: CPT

## 2020-02-07 PROCEDURE — 86803 HEPATITIS C AB TEST: CPT

## 2020-02-07 PROCEDURE — 87340 HEPATITIS B SURFACE AG IA: CPT

## 2020-02-07 PROCEDURE — 36415 COLL VENOUS BLD VENIPUNCTURE: CPT

## 2020-02-07 PROCEDURE — 86780 TREPONEMA PALLIDUM: CPT

## 2020-02-09 DIAGNOSIS — N76.0 BACTERIAL VAGINOSIS: ICD-10-CM

## 2020-02-09 DIAGNOSIS — B96.89 BACTERIAL VAGINOSIS: ICD-10-CM

## 2020-02-09 RX ORDER — METRONIDAZOLE 500 MG/1
TABLET ORAL
Qty: 14 TAB | Refills: 0 | Status: SHIPPED | OUTPATIENT
Start: 2020-02-09

## 2020-02-10 NOTE — PROGRESS NOTES
Chief Complaint   Patient presents with   • Sexually Transmitted Diseases     wants to be tested,odor in urine        HISTORY OF PRESENT ILLNESS: Jennifer Emerson is an established 29 y.o. female here to discuss the evaluation and management of:    Patient is a pleasant 29-year-old female here today to discuss abnormal vaginal odor for the past 1 week.  States she is been experiencing foul vaginal odor for the past 1 week.  She like to be screened for sexually transmitted infections.  She denies abnormal vaginal discharge.  Denies fever, chills, nausea, vomiting, lymphadenopathy, unintentional weight loss, myalgias, skin rash, joint pain, headache, genitalia blister/warts/lesions.  She tells me that she is in a monogamous relationship for the past 1+ months.  Denies condom use.  Patient is not on oral contraceptive.  She denies being pregnant.  States she has been with 3 partners since December 2019.  States she did not use protection with any of the partners.  She tells me her last menstrual period was February 1, 2020-February 5, 2020.  States her menses last 5 days in duration and positive for light bleeding.  Denies menstrual cramping.  Denies smoking.  Denies personal or family history of DVT/PEs.  Denies sedentary lifestyle.  Patient would like to be placed on oral contraceptive.      Patient Active Problem List    Diagnosis Date Noted   • New onset of headaches 08/30/2019   • Other headache syndrome 08/30/2019   • Eczema 08/30/2019       Allergies:Patient has no known allergies.    Current Outpatient Medications   Medication Sig Dispense Refill   • drospirenone-ethinyl estradiol (JOVANY) 3-0.03 MG per tablet Take 1 Tab by mouth every day. 84 Tab 3   • metroNIDAZOLE (FLAGYL) 500 MG Tab Take one 500 mg tab by mouth every 12 hours for 7 days 14 Tab 0     No current facility-administered medications for this visit.        Social History     Tobacco Use   • Smoking status: Never Smoker   • Smokeless  "tobacco: Never Used   Substance Use Topics   • Alcohol use: Yes     Comment: socially.    • Drug use: Yes     Types: Marijuana     Comment: daily.        Family Status   Relation Name Status   • Mo  Alive   • Fa     • Sis  Alive   • Bro  Alive   • MGMo  Alive   • MGFa     • PGMo  Alive   • PGFa  Alive     Family History   Problem Relation Age of Onset   • Cancer Father    • No Known Problems Sister    • No Known Problems Brother    • Heart Disease Paternal Grandmother    • Diabetes Paternal Grandfather    • Heart Attack Paternal Grandfather        ROS:  Review of Systems   Constitutional: Negative for fever, chills, weight loss and malaise/fatigue.   HENT: Negative for ear pain, nosebleeds, congestion, sore throat and neck pain.    Eyes: Negative for blurred vision.   Respiratory: Negative for cough, sputum production, shortness of breath and wheezing.    Cardiovascular: Negative for chest pain, palpitations, orthopnea and leg swelling.   Gastrointestinal: Negative for heartburn, nausea, vomiting and abdominal pain.   Genitourinary: Negative for dysuria, urgency and frequency.  Positive for foul-smelling vaginal odor.  Musculoskeletal: Negative for myalgias, back pain and joint pain.   Skin: Negative for rash and itching.   Neurological: Negative for dizziness, tingling, tremors, sensory change, focal weakness and headaches.   Endo/Heme/Allergies: Does not bruise/bleed easily.   Psychiatric/Behavioral: Negative for depression, suicidal ideas and memory loss.  The patient is not nervous/anxious and does not have insomnia.    All other systems reviewed and are negative except as in HPI.    Exam: /52 (BP Location: Left arm, Patient Position: Sitting)   Pulse 68   Temp 36.6 °C (97.8 °F) (Temporal)   Resp 16   Ht 1.549 m (5' 1\")   Wt 52.4 kg (115 lb 9.6 oz)   SpO2 99%  Body mass index is 21.84 kg/m².  General: Normal appearing. No distress.  HEENT: Normocephalic. Eyes conjunctiva clear lids " without ptosis, ears normal shape and contour.  Neck: Supple without JVD. Thyroid is not enlarged.  Pulmonary: Clear to ausculation.  Normal effort. No rales, ronchi, or wheezing.  Cardiovascular: Regular rate and rhythm without murmur.   Abdomen: Soft, nontender, nondistended.   Neurologic: Grossly nonfocal.  Cranial nerves are normal.  Lymph: No cervical or supraclavicular lymph nodes are palpable  Skin: Warm and dry.  No rashes or suspicious skin lesions.  Musculoskeletal: Normal gait. No extremity cyanosis, clubbing, or edema.  Psych: Normal mood and affect. Alert and oriented x3. Judgment and insight is normal.    Medical decision-making and discussion:  1. Routine screening for STI (sexually transmitted infection)  Lab work has been ordered to further evaluate patient.  Patient be contacted with results.  Discussed importance of practicing safe sex.  Discussed STI risk in great detail with patient.    - VAGINAL PATHOGENS DNA PANEL; Future  - HIV AG/AB COMBO ASSAY SCREENING; Future  - Chlamydia/GC PCR Urine Or Swab; Future  - HEP C VIRUS ANTIBODY; Future  - HEP B SURFACE ANTIGEN; Future    2. High risk heterosexual behavior  Same as # 1.    - VAGINAL PATHOGENS DNA PANEL; Future  - HIV AG/AB COMBO ASSAY SCREENING; Future  - Chlamydia/GC PCR Urine Or Swab; Future  - HEP C VIRUS ANTIBODY; Future  - HEP B SURFACE ANTIGEN; Future  - POCT Urinalysis    3. Encounter for initial prescription of contraceptive pills  POCT Pregnancy: Negative    Discussed OCPs at length. Instructed on importance of taking pill at the same time every day. Use back-up for up to 2 weeks. Discussed risks associated with OCPs including: blood clot, heart attack, and stroke. Advised not to smoke while on hormonal birth control.    Patient will follow-up in 2 months for evaluation.    - drospirenone-ethinyl estradiol (JOVANY) 3-0.03 MG per tablet; Take 1 Tab by mouth every day.  Dispense: 84 Tab; Refill: 3  - POCT PREGNANCY      Please note that  this dictation was created using voice recognition software. I have made every reasonable attempt to correct obvious errors, but I expect that there are errors of grammar and possibly content that I did not discover before finalizing the note.    Assessment/Plan:  1. Routine screening for STI (sexually transmitted infection)  VAGINAL PATHOGENS DNA PANEL    HIV AG/AB COMBO ASSAY SCREENING    Chlamydia/GC PCR Urine Or Swab    HEP C VIRUS ANTIBODY    HEP B SURFACE ANTIGEN    CANCELED: RPR (SYPHILIS)   2. High risk heterosexual behavior  VAGINAL PATHOGENS DNA PANEL    HIV AG/AB COMBO ASSAY SCREENING    Chlamydia/GC PCR Urine Or Swab    HEP C VIRUS ANTIBODY    HEP B SURFACE ANTIGEN    POCT Urinalysis    CANCELED: RPR (SYPHILIS)   3. Encounter for initial prescription of contraceptive pills  drospirenone-ethinyl estradiol (JOVANY) 3-0.03 MG per tablet    POCT PREGNANCY       Return in about 2 months (around 4/6/2020).

## 2020-04-10 ENCOUNTER — APPOINTMENT (OUTPATIENT)
Dept: MEDICAL GROUP | Facility: PHYSICIAN GROUP | Age: 30
End: 2020-04-10
Payer: COMMERCIAL